# Patient Record
Sex: FEMALE | Race: WHITE | Employment: UNEMPLOYED | URBAN - METROPOLITAN AREA
[De-identification: names, ages, dates, MRNs, and addresses within clinical notes are randomized per-mention and may not be internally consistent; named-entity substitution may affect disease eponyms.]

---

## 2021-05-18 ENCOUNTER — HOSPITAL ENCOUNTER (EMERGENCY)
Age: 31
Discharge: HOME | End: 2021-05-18
Attending: EMERGENCY MEDICINE
Payer: MEDICAID

## 2021-05-18 VITALS
HEIGHT: 67 IN | OXYGEN SATURATION: 99 % | BODY MASS INDEX: 28.72 KG/M2 | SYSTOLIC BLOOD PRESSURE: 126 MMHG | DIASTOLIC BLOOD PRESSURE: 78 MMHG | HEART RATE: 96 BPM | RESPIRATION RATE: 20 BRPM | TEMPERATURE: 98 F | WEIGHT: 183 LBS

## 2021-05-18 DIAGNOSIS — S05.02XA ABRASION OF LEFT CORNEA, INITIAL ENCOUNTER: Primary | ICD-10-CM

## 2021-05-18 PROCEDURE — 99283 EMERGENCY DEPT VISIT LOW MDM: CPT

## 2021-05-18 RX ORDER — TETRACAINE HYDROCHLORIDE 5 MG/ML
1 SOLUTION OPHTHALMIC ONCE
Status: COMPLETED | OUTPATIENT
Start: 2021-05-18 | End: 2021-05-18

## 2021-05-18 RX ORDER — KETOROLAC TROMETHAMINE 5 MG/ML
1 SOLUTION OPHTHALMIC 4 TIMES DAILY
Qty: 1 BOTTLE | Refills: 0 | Status: SHIPPED | OUTPATIENT
Start: 2021-05-18

## 2021-05-18 RX ORDER — TOBRAMYCIN 3 MG/ML
2 SOLUTION/ DROPS OPHTHALMIC EVERY 4 HOURS
Qty: 1 BOTTLE | Refills: 0 | Status: SHIPPED | OUTPATIENT
Start: 2021-05-18 | End: 2021-05-23

## 2021-05-18 NOTE — ED PROVIDER NOTES
Patient Seen in: THE [facility] In [place]      History   Patient presents with:   Eye Visual Problem    Stated Complaint: lt eye inj    HPI/Subjective:   HPI    62-year-old female comes to the hospital complaint of having difficulty with RRR  lungs: CTAB    ED Course   Labs Reviewed - No data to display       Tetracaine was used for pain control while here as well as fluorescein. MDM      Patient be discharged with outpatient management for her corneal abrasion follow-up.

## 2022-02-15 ENCOUNTER — HOSPITAL ENCOUNTER (EMERGENCY)
Age: 32
Discharge: HOME | End: 2022-02-15
Attending: EMERGENCY MEDICINE
Payer: MEDICAID

## 2022-02-15 VITALS
SYSTOLIC BLOOD PRESSURE: 116 MMHG | OXYGEN SATURATION: 98 % | RESPIRATION RATE: 18 BRPM | HEIGHT: 67.5 IN | BODY MASS INDEX: 26.68 KG/M2 | TEMPERATURE: 98 F | HEART RATE: 77 BPM | WEIGHT: 172 LBS | DIASTOLIC BLOOD PRESSURE: 83 MMHG

## 2022-02-15 DIAGNOSIS — M43.6 TORTICOLLIS: Primary | ICD-10-CM

## 2022-02-15 PROCEDURE — 99283 EMERGENCY DEPT VISIT LOW MDM: CPT

## 2022-02-15 PROCEDURE — 96372 THER/PROPH/DIAG INJ SC/IM: CPT

## 2022-02-15 RX ORDER — CYCLOBENZAPRINE HCL 10 MG
10 TABLET ORAL 3 TIMES DAILY PRN
Qty: 20 TABLET | Refills: 0 | Status: SHIPPED | OUTPATIENT
Start: 2022-02-15 | End: 2022-02-22

## 2022-02-15 RX ORDER — KETOROLAC TROMETHAMINE 10 MG/1
10 TABLET, FILM COATED ORAL EVERY 6 HOURS PRN
Qty: 30 TABLET | Refills: 0 | Status: SHIPPED | OUTPATIENT
Start: 2022-02-15 | End: 2022-02-22

## 2022-02-15 RX ORDER — KETOROLAC TROMETHAMINE 30 MG/ML
60 INJECTION, SOLUTION INTRAMUSCULAR; INTRAVENOUS ONCE
Status: COMPLETED | OUTPATIENT
Start: 2022-02-15 | End: 2022-02-15

## 2022-02-15 NOTE — ED INITIAL ASSESSMENT (HPI)
C/o right sided neck pain radiates to right arm and fingers since Sunday. States was lifting at work.

## 2022-02-15 NOTE — ED QUICK NOTES
Bedside report received,pt resting comfortably in bed,c/o neck pain. Pt given medication for pain, vital signs being taken. Pt states has no other complaints at the moment. Call light within in reach.

## 2023-02-09 ENCOUNTER — HOSPITAL ENCOUNTER (EMERGENCY)
Age: 33
Discharge: HOME | End: 2023-02-09
Attending: EMERGENCY MEDICINE
Payer: MEDICAID

## 2023-02-09 VITALS
DIASTOLIC BLOOD PRESSURE: 90 MMHG | RESPIRATION RATE: 20 BRPM | TEMPERATURE: 98 F | HEART RATE: 104 BPM | SYSTOLIC BLOOD PRESSURE: 127 MMHG | OXYGEN SATURATION: 99 % | BODY MASS INDEX: 25.43 KG/M2 | WEIGHT: 162 LBS | HEIGHT: 67 IN

## 2023-02-09 DIAGNOSIS — U07.1 COVID-19: Primary | ICD-10-CM

## 2023-02-09 DIAGNOSIS — J11.1 INFLUENZA: ICD-10-CM

## 2023-02-09 LAB
POCT INFLUENZA A: NEGATIVE
POCT INFLUENZA B: POSITIVE
SARS-COV-2 RNA RESP QL NAA+PROBE: DETECTED

## 2023-02-09 PROCEDURE — 87502 INFLUENZA DNA AMP PROBE: CPT | Performed by: EMERGENCY MEDICINE

## 2023-02-09 PROCEDURE — 99283 EMERGENCY DEPT VISIT LOW MDM: CPT

## 2023-02-09 PROCEDURE — 87502 INFLUENZA DNA AMP PROBE: CPT

## 2023-02-09 RX ORDER — DEXTROAMPHETAMINE SACCHARATE, AMPHETAMINE ASPARTATE MONOHYDRATE, DEXTROAMPHETAMINE SULFATE AND AMPHETAMINE SULFATE 2.5; 2.5; 2.5; 2.5 MG/1; MG/1; MG/1; MG/1
20 CAPSULE, EXTENDED RELEASE ORAL EVERY MORNING
COMMUNITY

## 2023-02-09 NOTE — DISCHARGE INSTRUCTIONS
Self quarantine for 5 days from the onset of symptoms Tylenol or Advil as needed over-the-counter cough or cold medications return for worsening symptoms.

## 2023-03-06 ENCOUNTER — HOSPITAL ENCOUNTER (EMERGENCY)
Age: 33
Discharge: HOME | End: 2023-03-06
Attending: EMERGENCY MEDICINE
Payer: MEDICAID

## 2023-03-06 VITALS
WEIGHT: 165 LBS | TEMPERATURE: 98 F | SYSTOLIC BLOOD PRESSURE: 125 MMHG | BODY MASS INDEX: 26 KG/M2 | DIASTOLIC BLOOD PRESSURE: 95 MMHG | RESPIRATION RATE: 16 BRPM | OXYGEN SATURATION: 100 % | HEART RATE: 98 BPM

## 2023-03-06 DIAGNOSIS — S05.02XA ABRASION OF LEFT CORNEA, INITIAL ENCOUNTER: Primary | ICD-10-CM

## 2023-03-06 PROCEDURE — 99283 EMERGENCY DEPT VISIT LOW MDM: CPT

## 2023-03-06 RX ORDER — ERYTHROMYCIN 5 MG/G
1 OINTMENT OPHTHALMIC EVERY 6 HOURS
Qty: 1 G | Refills: 0 | Status: SHIPPED | OUTPATIENT
Start: 2023-03-06 | End: 2023-03-13

## 2023-03-06 RX ORDER — IBUPROFEN 600 MG/1
600 TABLET ORAL EVERY 8 HOURS PRN
Qty: 30 TABLET | Refills: 0 | Status: SHIPPED | OUTPATIENT
Start: 2023-03-06 | End: 2023-03-16

## 2023-03-06 RX ORDER — TETRACAINE HYDROCHLORIDE 5 MG/ML
1 SOLUTION OPHTHALMIC ONCE
Status: COMPLETED | OUTPATIENT
Start: 2023-03-06 | End: 2023-03-06

## 2023-11-02 ENCOUNTER — HOSPITAL ENCOUNTER (EMERGENCY)
Age: 33
Discharge: HOME | End: 2023-11-02
Attending: EMERGENCY MEDICINE
Payer: MEDICAID

## 2023-11-02 ENCOUNTER — APPOINTMENT (OUTPATIENT)
Dept: GENERAL RADIOLOGY | Age: 33
End: 2023-11-02
Attending: EMERGENCY MEDICINE
Payer: MEDICAID

## 2023-11-02 VITALS
RESPIRATION RATE: 18 BRPM | SYSTOLIC BLOOD PRESSURE: 123 MMHG | TEMPERATURE: 99 F | DIASTOLIC BLOOD PRESSURE: 77 MMHG | BODY MASS INDEX: 25.9 KG/M2 | WEIGHT: 165 LBS | OXYGEN SATURATION: 100 % | HEIGHT: 67 IN | HEART RATE: 83 BPM

## 2023-11-02 DIAGNOSIS — T14.8XXA PUNCTURE WOUND: Primary | ICD-10-CM

## 2023-11-02 PROCEDURE — 99284 EMERGENCY DEPT VISIT MOD MDM: CPT

## 2023-11-02 PROCEDURE — 99283 EMERGENCY DEPT VISIT LOW MDM: CPT

## 2023-11-02 PROCEDURE — 28192 REMOVAL OF FOOT FOREIGN BODY: CPT

## 2023-11-02 PROCEDURE — 73630 X-RAY EXAM OF FOOT: CPT | Performed by: EMERGENCY MEDICINE

## 2023-11-02 RX ORDER — AMOXICILLIN AND CLAVULANATE POTASSIUM 875; 125 MG/1; MG/1
1 TABLET, FILM COATED ORAL 2 TIMES DAILY
Qty: 20 TABLET | Refills: 0 | Status: SHIPPED | OUTPATIENT
Start: 2023-11-02 | End: 2023-11-12

## 2023-11-02 RX ORDER — LISDEXAMFETAMINE DIMESYLATE CAPSULES 40 MG/1
40 CAPSULE ORAL EVERY MORNING
COMMUNITY

## 2023-11-02 NOTE — ED INITIAL ASSESSMENT (HPI)
Pt stepped on a toothpick left between 4-5th toe pt states redness and swelling Upper Endoscopy in Children   WHAT YOU NEED TO KNOW:   An upper endoscopy is also called an upper gastrointestinal (GI) endoscopy, or an esophagogastroduodenoscopy (EGD)  Your child may feel bloated, gassy, or have some abdominal discomfort or distention  Your child's throat may be sore for 24 to 36 hours after the procedure  It is normal for your child to spit up a small amount of blood  Your child may burp or pass gas from air that is still inside the body after the procedure  DISCHARGE INSTRUCTIONS:   Call 911 for any of the following:   · Your child complains of pain in his or her chest      · Your child has trouble breathing  Seek care immediately if:   · Your child feels dizzy or faints  · Your child has trouble swallowing  · Your child has severe throat pain  · Your child's bowel movements are very dark, black, or you can see bright red blood in them  · Your child's stomach is very painful, feels hard, and is larger than usual      · Your child vomits blood  Contact your child's healthcare provider if:   · Your child has a fever or chills  · Your child cannot pass gas  · Your child does not have a bowel movement for 3 days after the procedure  · Your child has nausea or is vomiting  · Your child's skin is itchy, swollen, or he has a rash  · You have questions or concerns about your child's condition or care  Care for your child:   · Limit your child's activity after the procedure  Have your child lie on the couch or rest quietly until the day after the procedure  He or she may feel tired and need frequent naps  Your child can take short walks to the bathroom or around the house to help pass gas  He or she should not play sports or do vigorous activity after the procedure  Your child may be able to go to school or return to  the day after the procedure  Ask your healthcare provider when your child can return to normal activities       · Relieve your child's gas and discomfort  Have your child lie on his or her left side  Your child may need to take short walks to help move the gas out  Give your child small meals until the bloating has gone away  Start with clear liquids such as juice without pulp  If your child does okay with clear liquids, start with his or her usual foods  Ask your child's healthcare provider if your child needs to be on a special diet  · Relieve your child's sore throat  Give your child crushed ice or flavored ice pops  Follow up with your child's healthcare provider as directed:  Write down your questions so you remember to ask them during your visits  © Copyright [address] Information is for End User's use only and may not be sold, redistributed or otherwise used for commercial purposes  All illustrations and images included in CareNotes® are the copyrighted property of [employer]  or [facility] Louise Gastelum   The above information is an  only  It is not intended as medical advice for individual conditions or treatments  Talk to your doctor, nurse or pharmacist before following any medical regimen to see if it is safe and effective for you

## 2024-09-24 ENCOUNTER — HOSPITAL ENCOUNTER (EMERGENCY)
Age: 34
Discharge: HOME | End: 2024-09-24
Attending: EMERGENCY MEDICINE
Payer: MEDICAID

## 2024-09-24 VITALS
BODY MASS INDEX: 27.47 KG/M2 | WEIGHT: 175 LBS | HEART RATE: 100 BPM | OXYGEN SATURATION: 100 % | DIASTOLIC BLOOD PRESSURE: 98 MMHG | SYSTOLIC BLOOD PRESSURE: 134 MMHG | HEIGHT: 67 IN | RESPIRATION RATE: 20 BRPM | TEMPERATURE: 99 F

## 2024-09-24 DIAGNOSIS — R60.0 SWELLING OF BOTH PAROTID GLANDS: Primary | ICD-10-CM

## 2024-09-24 PROCEDURE — 99283 EMERGENCY DEPT VISIT LOW MDM: CPT

## 2024-09-24 PROCEDURE — 87798 DETECT AGENT NOS DNA AMP: CPT | Performed by: EMERGENCY MEDICINE

## 2024-09-24 NOTE — ED INITIAL ASSESSMENT (HPI)
Pt noted swelling to right side of neck,feels like a pressure to the area,  Denies sore throat or fever, ears popping

## 2024-09-24 NOTE — ED PROVIDER NOTES
Patient Seen in: [facility] In [place]      History     Chief Complaint   Patient presents with    Swelling     Stated Complaint: swelling to neck - ear pain - denies cough    Subjective:   HPI    Patient complains of swelling to her face.  Patient woke this morning and noticed that her face seemed swollen.  She notes the area just anterior to her ears down to the angle of her jaw was swollen on both sides much more on the right than left.  Her boyfriend states that the left has now gone down and seems to be looking normally but the right still seems swollen.  Patient has not had any recent high fever or shaking chills.  No cold or cough.  She feels pressure in her ears and in the back of her throat.  No difficulty in speech or swallowing.  No shortness of breath or painful breathing.    Objective:   Past Medical History:    ADHD              Past Surgical History:   Procedure Laterality Date          Repair ing hernia,4+y/o,reducibl      Tonsillectomy                  Social History     Socioeconomic History    Marital status:    Tobacco Use    Smoking status: Never    Smokeless tobacco: Never   Vaping Use    Vaping status: Never Used   Substance and Sexual Activity    Alcohol use: Yes     Comment: social    Drug use: Yes     Types: Cannabis     Social Determinants of Health      [facility]    Social Connections    [facility] from [facility], [facility]    Housing Stability              Review of Systems    Positive for stated Chief Complaint: Swelling    Other systems are as noted in HPI.  Constitutional and vital signs reviewed.      All other systems reviewed and negative except as noted above.    Physical Exam     ED Triage Vitals [24 0923]   BP (!) 134/98   Pulse 100   Resp 20   Temp 98.5 °F (36.9 °C)   Temp src Temporal   SpO2 100 %   O2 Device None (Room air)        Current Vitals:   Vital Signs  BP: (!) 134/98  Pulse: 100  Resp: 20  Temp: 98.5 °F (36.9 °C)  Temp src: Temporal    Oxygen Therapy  SpO2: 100 %  O2 Device: None (Room air)            Physical Exam  General: The patient is awake, alert, conversant.  Her speech is clear.  She is tolerating her secretions well and breathing comfortably.  Her face is asymmetric.  There is swelling overlying the area of the parotid on the right side.  I do not appreciate similar swelling on the left.  There is some mild tenderness overlying the parotid on the right.  No erythema or other rash noted in this area.  I do not appreciate similar swelling of the submandibular glands.  Eyes: sclera white, conjunctiva pink and moist.  Lids and lashes are normal.  Ears: TMs are normal bilaterally.  No swelling or erythema overlying the mastoids  Neck: Supple without extraordinary anterior superior adenopathy or posterior nodes.  Lungs: Clear to auscultation bilaterally.  No rhonchi or rales.  Neurologic:  Mental status as above.  Patient moves all extremities with good strength and coordination.           ED Course     Labs Reviewed   MUMPS RNA (PCR),QUALITATIVE,ACUTE INFECTION                      MDM        Patient complains of swelling overlying her parotid glands bilaterally.  The left side seems to have returned to normal but the right has not.  Most common reason for sudden parotid swelling would be sialolithiasis but this would not be very likely simultaneously bilateral.  No overlying erythema or fever to suggest sialoadenitis.  Mumps can present as salivary gland swelling but patient without fever or other viral syndrome symptoms.  Mumps PCR sent.    I suggest supportive care.  Technically, if mumps is considered, patient needs to stay home for 9 days on after onset of the symptoms avoiding school, work, public places or other social activities pending mumps PCR testing.    I will have patient suck on sour candies to facilitate  saliva production.                                       Medical Decision Making      Disposition and Plan     Clinical Impression:  1. Swelling of both parotid glands         Disposition:  Discharge  9/24/2024  9:40 am    Follow-up:  Esther Allen MD  [address]  [place] [id number] [phone]    Call today            Medications Prescribed:  Current Discharge Medication List        START taking these medications    Details   amoxicillin clavulanate 875-125 MG Oral Tab Take 1 tablet by mouth 2 (two) times daily for 5 days.  Qty: 10 tablet, Refills: 0

## 2024-09-24 NOTE — DISCHARGE INSTRUCTIONS
Suck on sour candies to facilitate saliva production  Tylenol or Motrin for pain  You may apply a cool compress to the area of swelling 20 minutes at a time  Augmentin antibiotic to prevent infection    If mumps is considered as a diagnosis, it is recommended that you avoid school, work, public places, or other social activities pending mumps testing.  You are potentially contagious for a period of 9 days after the onset of your symptoms    Contact your primary care doctor and/or ENT specialist today to arrange close follow-up

## 2024-09-26 LAB — MUMPS ANTIBODY RNA (PCR),QUALITATIVE: NEGATIVE
